# Patient Record
Sex: FEMALE | Race: WHITE | Employment: FULL TIME | URBAN - METROPOLITAN AREA
[De-identification: names, ages, dates, MRNs, and addresses within clinical notes are randomized per-mention and may not be internally consistent; named-entity substitution may affect disease eponyms.]

---

## 2022-07-15 ENCOUNTER — PREP FOR PROCEDURE (OUTPATIENT)
Dept: ADMINISTRATIVE | Age: 62
End: 2022-07-15

## 2022-07-15 RX ORDER — GLUCOSAMINE/D3/BOSWELLIA SERRA 1500MG-400
1 TABLET ORAL DAILY
COMMUNITY

## 2022-07-15 RX ORDER — MULTIVIT-MIN/IRON/FOLIC ACID/K 18-600-40
1 CAPSULE ORAL DAILY
COMMUNITY

## 2022-07-15 RX ORDER — MULTIVIT WITH MINERALS/LUTEIN
1000 TABLET ORAL DAILY
COMMUNITY

## 2022-07-15 NOTE — PERIOP NOTE
Patient verified name and . Order for consent not found in EHR and matches case posting; patient verifies procedure. Type 1B surgery, phone assessment complete. Orders none received. Labs per surgeon: none  Labs per anesthesia protocol: none    Patient answered medical/surgical history questions at their best of ability. All prior to admission medications documented in [facility]. Patient instructed to take the following medications the day of surgery according to anesthesia guidelines with a small sip of water: Sertraline (Zoloft)   On the day before surgery please take Acetaminophen 1000mg in the morning and then again before bed. You may substitute for Tylenol 650 mg. Hold all vitamins 7 days prior to surgery and NSAIDS 5 days prior to surgery. Prescription meds to hold:none  Patient instructed on the following:    > Arrive [address], time of arrival to be called the day before by 1700  > NPO after midnight, unless otherwise indicated, including gum, mints, and ice chips  > Responsible adult must drive patient to the hospital, stay during surgery, and patient will need supervision 24 hours after anesthesia  > Use antibacterial soap in shower the night before surgery and on the morning of surgery  > All piercings must be removed prior to arrival.    > Leave all valuables (money and jewelry) at home but bring insurance card and ID on DOS.   > You may be required to pay a deductible or co-pay on the day of your procedure. You can pre-pay by calling [phone] if your surgery is at the [facility] or [phone] if your surgery is at the [facility]. > Do not wear make-up, nail polish, lotions, cologne, perfumes, powders, or oil on skin. Artificial nails are not permitted.

## 2022-07-18 ENCOUNTER — ANESTHESIA EVENT (OUTPATIENT)
Dept: SURGERY | Age: 62
End: 2022-07-18
Payer: COMMERCIAL

## 2022-07-19 ENCOUNTER — ANESTHESIA (OUTPATIENT)
Dept: SURGERY | Age: 62
End: 2022-07-19
Payer: COMMERCIAL

## 2022-07-19 ENCOUNTER — HOSPITAL ENCOUNTER (OUTPATIENT)
Age: 62
Setting detail: OUTPATIENT SURGERY
Discharge: HOME | End: 2022-07-19
Attending: OPHTHALMOLOGY | Admitting: OPHTHALMOLOGY
Payer: COMMERCIAL

## 2022-07-19 VITALS
BODY MASS INDEX: 23.14 KG/M2 | HEART RATE: 91 BPM | SYSTOLIC BLOOD PRESSURE: 113 MMHG | WEIGHT: 144 LBS | DIASTOLIC BLOOD PRESSURE: 62 MMHG | HEIGHT: 66 IN | OXYGEN SATURATION: 92 % | RESPIRATION RATE: 15 BRPM | TEMPERATURE: 98 F

## 2022-07-19 PROCEDURE — 6370000000 HC RX 637 (ALT 250 FOR IP): Performed by: OPHTHALMOLOGY

## 2022-07-19 PROCEDURE — 2580000003 HC RX 258: Performed by: ANESTHESIOLOGY

## 2022-07-19 PROCEDURE — 3600000012 HC SURGERY LEVEL 2 ADDTL 15MIN: Performed by: OPHTHALMOLOGY

## 2022-07-19 PROCEDURE — 2720000010 HC SURG SUPPLY STERILE: Performed by: OPHTHALMOLOGY

## 2022-07-19 PROCEDURE — 6360000002 HC RX W HCPCS: Performed by: OPHTHALMOLOGY

## 2022-07-19 PROCEDURE — 6370000000 HC RX 637 (ALT 250 FOR IP): Performed by: ANESTHESIOLOGY

## 2022-07-19 PROCEDURE — C1814 RETINAL TAMP, SILICONE OIL: HCPCS | Performed by: OPHTHALMOLOGY

## 2022-07-19 PROCEDURE — 2709999900 HC NON-CHARGEABLE SUPPLY: Performed by: OPHTHALMOLOGY

## 2022-07-19 PROCEDURE — 2500000003 HC RX 250 WO HCPCS: Performed by: OPHTHALMOLOGY

## 2022-07-19 PROCEDURE — 6360000002 HC RX W HCPCS

## 2022-07-19 PROCEDURE — 7100000000 HC PACU RECOVERY - FIRST 15 MIN: Performed by: OPHTHALMOLOGY

## 2022-07-19 PROCEDURE — 3600000002 HC SURGERY LEVEL 2 BASE: Performed by: OPHTHALMOLOGY

## 2022-07-19 PROCEDURE — 2580000003 HC RX 258: Performed by: NURSE ANESTHETIST, CERTIFIED REGISTERED

## 2022-07-19 PROCEDURE — C1784 OCULAR DEV, INTRAOP, DET RET: HCPCS | Performed by: OPHTHALMOLOGY

## 2022-07-19 PROCEDURE — C1713 ANCHOR/SCREW BN/BN,TIS/BN: HCPCS | Performed by: OPHTHALMOLOGY

## 2022-07-19 PROCEDURE — 6360000002 HC RX W HCPCS: Performed by: NURSE ANESTHETIST, CERTIFIED REGISTERED

## 2022-07-19 PROCEDURE — 7100000001 HC PACU RECOVERY - ADDTL 15 MIN: Performed by: OPHTHALMOLOGY

## 2022-07-19 PROCEDURE — 7100000010 HC PHASE II RECOVERY - FIRST 15 MIN: Performed by: OPHTHALMOLOGY

## 2022-07-19 PROCEDURE — 2500000003 HC RX 250 WO HCPCS: Performed by: NURSE ANESTHETIST, CERTIFIED REGISTERED

## 2022-07-19 PROCEDURE — 3700000001 HC ADD 15 MINUTES (ANESTHESIA): Performed by: OPHTHALMOLOGY

## 2022-07-19 PROCEDURE — 3700000000 HC ANESTHESIA ATTENDED CARE: Performed by: OPHTHALMOLOGY

## 2022-07-19 PROCEDURE — 6360000002 HC RX W HCPCS: Performed by: ANESTHESIOLOGY

## 2022-07-19 DEVICE — SILIKON™ 1000 (PURIFIED POLYDIMETHYLSILOXANE) IS HIGHLY PURIFIED LONG CHAIN POLYDIMETHYLSILOXANE TRIMETHYLSILOXY TERMINATED SILICONE OIL. IT IS STERILE,NON-PYROGENIC, CLEAR, COLORLESS AND HAS A VISCOSITY OF 1000 CS. FOR USE AS A POST-OPERATIVE RETINAL TAMPONADE DURING VITREORETINAL SURGERY. SILIKON™ 1000IS COMPOSED OF SILICON, OXYGEN, CARBON AND HYDROGEN ATOMS. SILIKON™ 1000 IS IMMISCIBLE WITH AQUEOUS COMPONENTS AND IS RELATIVELY INERT MATERIAL WITHLITTLE BIOLOGICAL TOXICITY POTENTIAL.
Type: IMPLANTABLE DEVICE | Site: EYE | Status: FUNCTIONAL
Brand: SILIKON™

## 2022-07-19 DEVICE — SLEEVE SCLER BCKL L30IN OD21IN ID1IN SIL STYL 70: Type: IMPLANTABLE DEVICE | Site: EYE | Status: FUNCTIONAL

## 2022-07-19 DEVICE — STRIP SCLER W3.5XL125MM THK0.75MM SIL SLD STYL 41/S2970: Type: IMPLANTABLE DEVICE | Site: EYE | Status: FUNCTIONAL

## 2022-07-19 RX ORDER — TROPICAMIDE 10 MG/ML
1 SOLUTION/ DROPS OPHTHALMIC
Status: COMPLETED | OUTPATIENT
Start: 2022-07-19 | End: 2022-07-19

## 2022-07-19 RX ORDER — PHENYLEPHRINE HYDROCHLORIDE 25 MG/ML
1 SOLUTION/ DROPS OPHTHALMIC
Status: CANCELLED | OUTPATIENT
Start: 2022-07-19 | End: 2022-07-19

## 2022-07-19 RX ORDER — PROCHLORPERAZINE EDISYLATE 5 MG/ML
5 INJECTION INTRAMUSCULAR; INTRAVENOUS
Status: DISCONTINUED | OUTPATIENT
Start: 2022-07-19 | End: 2022-07-19 | Stop reason: HOSPADM

## 2022-07-19 RX ORDER — TRIAMCINOLONE ACETONIDE 40 MG/ML
INJECTION, SUSPENSION INTRA-ARTICULAR; INTRAMUSCULAR PRN
Status: DISCONTINUED | OUTPATIENT
Start: 2022-07-19 | End: 2022-07-19 | Stop reason: ALTCHOICE

## 2022-07-19 RX ORDER — ACETAMINOPHEN 500 MG
1000 TABLET ORAL ONCE
Status: COMPLETED | OUTPATIENT
Start: 2022-07-19 | End: 2022-07-19

## 2022-07-19 RX ORDER — CEFAZOLIN SODIUM 1 G/3ML
INJECTION, POWDER, FOR SOLUTION INTRAMUSCULAR; INTRAVENOUS PRN
Status: DISCONTINUED | OUTPATIENT
Start: 2022-07-19 | End: 2022-07-19 | Stop reason: ALTCHOICE

## 2022-07-19 RX ORDER — BETAMETHASONE SODIUM PHOSPHATE AND BETAMETHASONE ACETATE 3; 3 MG/ML; MG/ML
INJECTION, SUSPENSION INTRA-ARTICULAR; INTRALESIONAL; INTRAMUSCULAR; SOFT TISSUE PRN
Status: DISCONTINUED | OUTPATIENT
Start: 2022-07-19 | End: 2022-07-19 | Stop reason: ALTCHOICE

## 2022-07-19 RX ORDER — PHENYLEPHRINE HCL 2.5 %
1 DROPS OPHTHALMIC (EYE)
Status: COMPLETED | OUTPATIENT
Start: 2022-07-19 | End: 2022-07-19

## 2022-07-19 RX ORDER — KETOROLAC TROMETHAMINE 30 MG/ML
INJECTION, SOLUTION INTRAMUSCULAR; INTRAVENOUS PRN
Status: DISCONTINUED | OUTPATIENT
Start: 2022-07-19 | End: 2022-07-19 | Stop reason: SDUPTHER

## 2022-07-19 RX ORDER — DEXAMETHASONE SODIUM PHOSPHATE 4 MG/ML
INJECTION, SOLUTION INTRA-ARTICULAR; INTRALESIONAL; INTRAMUSCULAR; INTRAVENOUS; SOFT TISSUE PRN
Status: DISCONTINUED | OUTPATIENT
Start: 2022-07-19 | End: 2022-07-19 | Stop reason: SDUPTHER

## 2022-07-19 RX ORDER — TROPICAMIDE 10 MG/ML
1 SOLUTION/ DROPS OPHTHALMIC
Status: CANCELLED | OUTPATIENT
Start: 2022-07-19 | End: 2022-07-19

## 2022-07-19 RX ORDER — OXYCODONE HYDROCHLORIDE 5 MG/1
10 TABLET ORAL PRN
Status: DISCONTINUED | OUTPATIENT
Start: 2022-07-19 | End: 2022-07-19 | Stop reason: HOSPADM

## 2022-07-19 RX ORDER — FENTANYL CITRATE 50 UG/ML
INJECTION, SOLUTION INTRAMUSCULAR; INTRAVENOUS PRN
Status: DISCONTINUED | OUTPATIENT
Start: 2022-07-19 | End: 2022-07-19 | Stop reason: SDUPTHER

## 2022-07-19 RX ORDER — BRIMONIDINE TARTRATE 2 MG/ML
SOLUTION/ DROPS OPHTHALMIC PRN
Status: DISCONTINUED | OUTPATIENT
Start: 2022-07-19 | End: 2022-07-19 | Stop reason: ALTCHOICE

## 2022-07-19 RX ORDER — ATROPINE SULFATE 10 MG/ML
1 SOLUTION/ DROPS OPHTHALMIC
Status: COMPLETED | OUTPATIENT
Start: 2022-07-19 | End: 2022-07-19

## 2022-07-19 RX ORDER — NEOMYCIN SULFATE, POLYMYXIN B SULFATE, AND DEXAMETHASONE 3.5; 10000; 1 MG/G; [USP'U]/G; MG/G
OINTMENT OPHTHALMIC PRN
Status: DISCONTINUED | OUTPATIENT
Start: 2022-07-19 | End: 2022-07-19 | Stop reason: ALTCHOICE

## 2022-07-19 RX ORDER — LIDOCAINE HYDROCHLORIDE 20 MG/ML
INJECTION, SOLUTION EPIDURAL; INFILTRATION; INTRACAUDAL; PERINEURAL PRN
Status: DISCONTINUED | OUTPATIENT
Start: 2022-07-19 | End: 2022-07-19 | Stop reason: ALTCHOICE

## 2022-07-19 RX ORDER — DIPHENHYDRAMINE HYDROCHLORIDE 50 MG/ML
12.5 INJECTION INTRAMUSCULAR; INTRAVENOUS
Status: DISCONTINUED | OUTPATIENT
Start: 2022-07-19 | End: 2022-07-19 | Stop reason: HOSPADM

## 2022-07-19 RX ORDER — SODIUM CHLORIDE 0.9 % (FLUSH) 0.9 %
5-40 SYRINGE (ML) INJECTION EVERY 12 HOURS SCHEDULED
Status: DISCONTINUED | OUTPATIENT
Start: 2022-07-19 | End: 2022-07-19 | Stop reason: HOSPADM

## 2022-07-19 RX ORDER — OXYCODONE HYDROCHLORIDE 5 MG/1
5 TABLET ORAL PRN
Status: DISCONTINUED | OUTPATIENT
Start: 2022-07-19 | End: 2022-07-19 | Stop reason: HOSPADM

## 2022-07-19 RX ORDER — ONDANSETRON 2 MG/ML
4 INJECTION INTRAMUSCULAR; INTRAVENOUS
Status: DISCONTINUED | OUTPATIENT
Start: 2022-07-19 | End: 2022-07-19 | Stop reason: HOSPADM

## 2022-07-19 RX ORDER — LIDOCAINE HYDROCHLORIDE 10 MG/ML
1 INJECTION, SOLUTION EPIDURAL; INFILTRATION; INTRACAUDAL; PERINEURAL
Status: DISCONTINUED | OUTPATIENT
Start: 2022-07-19 | End: 2022-07-19 | Stop reason: HOSPADM

## 2022-07-19 RX ORDER — HYDROMORPHONE HYDROCHLORIDE 2 MG/ML
0.25 INJECTION, SOLUTION INTRAMUSCULAR; INTRAVENOUS; SUBCUTANEOUS EVERY 5 MIN PRN
Status: DISCONTINUED | OUTPATIENT
Start: 2022-07-19 | End: 2022-07-19 | Stop reason: HOSPADM

## 2022-07-19 RX ORDER — BUPIVACAINE HYDROCHLORIDE 7.5 MG/ML
INJECTION, SOLUTION EPIDURAL; RETROBULBAR PRN
Status: DISCONTINUED | OUTPATIENT
Start: 2022-07-19 | End: 2022-07-19 | Stop reason: ALTCHOICE

## 2022-07-19 RX ORDER — HYDROMORPHONE HYDROCHLORIDE 2 MG/ML
0.5 INJECTION, SOLUTION INTRAMUSCULAR; INTRAVENOUS; SUBCUTANEOUS EVERY 5 MIN PRN
Status: DISCONTINUED | OUTPATIENT
Start: 2022-07-19 | End: 2022-07-19 | Stop reason: HOSPADM

## 2022-07-19 RX ORDER — ONDANSETRON 2 MG/ML
INJECTION INTRAMUSCULAR; INTRAVENOUS PRN
Status: DISCONTINUED | OUTPATIENT
Start: 2022-07-19 | End: 2022-07-19 | Stop reason: SDUPTHER

## 2022-07-19 RX ORDER — MIDAZOLAM HYDROCHLORIDE 2 MG/2ML
2 INJECTION, SOLUTION INTRAMUSCULAR; INTRAVENOUS
Status: COMPLETED | OUTPATIENT
Start: 2022-07-19 | End: 2022-07-19

## 2022-07-19 RX ORDER — ATROPINE SULFATE 10 MG/ML
1 SOLUTION/ DROPS OPHTHALMIC
Status: CANCELLED | OUTPATIENT
Start: 2022-07-19 | End: 2022-07-19

## 2022-07-19 RX ORDER — PROPOFOL 10 MG/ML
INJECTION, EMULSION INTRAVENOUS PRN
Status: DISCONTINUED | OUTPATIENT
Start: 2022-07-19 | End: 2022-07-19 | Stop reason: SDUPTHER

## 2022-07-19 RX ORDER — SODIUM CHLORIDE 9 MG/ML
INJECTION, SOLUTION INTRAVENOUS PRN
Status: DISCONTINUED | OUTPATIENT
Start: 2022-07-19 | End: 2022-07-19 | Stop reason: HOSPADM

## 2022-07-19 RX ORDER — LIDOCAINE HYDROCHLORIDE 20 MG/ML
INJECTION, SOLUTION EPIDURAL; INFILTRATION; INTRACAUDAL; PERINEURAL PRN
Status: DISCONTINUED | OUTPATIENT
Start: 2022-07-19 | End: 2022-07-19 | Stop reason: SDUPTHER

## 2022-07-19 RX ORDER — SODIUM CHLORIDE, SODIUM LACTATE, POTASSIUM CHLORIDE, CALCIUM CHLORIDE 600; 310; 30; 20 MG/100ML; MG/100ML; MG/100ML; MG/100ML
INJECTION, SOLUTION INTRAVENOUS CONTINUOUS
Status: DISCONTINUED | OUTPATIENT
Start: 2022-07-19 | End: 2022-07-19 | Stop reason: HOSPADM

## 2022-07-19 RX ORDER — SODIUM CHLORIDE 0.9 % (FLUSH) 0.9 %
5-40 SYRINGE (ML) INJECTION PRN
Status: DISCONTINUED | OUTPATIENT
Start: 2022-07-19 | End: 2022-07-19 | Stop reason: HOSPADM

## 2022-07-19 RX ORDER — EPHEDRINE SULFATE/0.9% NACL/PF 50 MG/5 ML
SYRINGE (ML) INTRAVENOUS PRN
Status: DISCONTINUED | OUTPATIENT
Start: 2022-07-19 | End: 2022-07-19 | Stop reason: SDUPTHER

## 2022-07-19 RX ADMIN — LIDOCAINE HYDROCHLORIDE 100 MG: 20 INJECTION, SOLUTION EPIDURAL; INFILTRATION; INTRACAUDAL; PERINEURAL at 13:24

## 2022-07-19 RX ADMIN — KETOROLAC TROMETHAMINE 15 MG: 30 INJECTION, SOLUTION INTRAMUSCULAR; INTRAVENOUS at 15:21

## 2022-07-19 RX ADMIN — MIDAZOLAM 1 MG: 1 INJECTION INTRAMUSCULAR; INTRAVENOUS at 13:20

## 2022-07-19 RX ADMIN — PHENYLEPHRINE HYDROCHLORIDE 0.5 ML: 10 INJECTION INTRAVENOUS at 14:03

## 2022-07-19 RX ADMIN — SODIUM CHLORIDE, POTASSIUM CHLORIDE, SODIUM LACTATE AND CALCIUM CHLORIDE: 600; 310; 30; 20 INJECTION, SOLUTION INTRAVENOUS at 12:57

## 2022-07-19 RX ADMIN — PHENYLEPHRINE HYDROCHLORIDE 0.5 ML: 10 INJECTION INTRAVENOUS at 14:19

## 2022-07-19 RX ADMIN — Medication 5 MG: at 13:58

## 2022-07-19 RX ADMIN — ONDANSETRON 4 MG: 2 INJECTION INTRAMUSCULAR; INTRAVENOUS at 14:24

## 2022-07-19 RX ADMIN — PHENYLEPHRINE HYDROCHLORIDE 1 DROP: 25 SOLUTION/ DROPS OPHTHALMIC at 12:36

## 2022-07-19 RX ADMIN — TROPICAMIDE 1 DROP: 10 SOLUTION/ DROPS OPHTHALMIC at 12:37

## 2022-07-19 RX ADMIN — PHENYLEPHRINE HYDROCHLORIDE 0.5 ML: 10 INJECTION INTRAVENOUS at 14:07

## 2022-07-19 RX ADMIN — Medication 5 MG: at 13:52

## 2022-07-19 RX ADMIN — DEXAMETHASONE SODIUM PHOSPHATE 4 MG: 4 INJECTION, SOLUTION INTRAMUSCULAR; INTRAVENOUS at 13:24

## 2022-07-19 RX ADMIN — ATROPINE SULFATE 1 DROP: 10 SOLUTION/ DROPS OPHTHALMIC at 12:44

## 2022-07-19 RX ADMIN — PHENYLEPHRINE HYDROCHLORIDE 1 ML: 10 INJECTION INTRAVENOUS at 14:28

## 2022-07-19 RX ADMIN — PHENYLEPHRINE HYDROCHLORIDE 0.5 ML: 10 INJECTION INTRAVENOUS at 14:38

## 2022-07-19 RX ADMIN — ACETAMINOPHEN 1000 MG: 500 TABLET, FILM COATED ORAL at 12:42

## 2022-07-19 RX ADMIN — TROPICAMIDE 1 DROP: 10 SOLUTION/ DROPS OPHTHALMIC at 12:44

## 2022-07-19 RX ADMIN — Medication 5 MG: at 13:34

## 2022-07-19 RX ADMIN — Medication 5 MG: at 13:49

## 2022-07-19 RX ADMIN — FENTANYL CITRATE 25 MCG: 50 INJECTION, SOLUTION INTRAMUSCULAR; INTRAVENOUS at 13:24

## 2022-07-19 RX ADMIN — Medication 5 MG: at 13:46

## 2022-07-19 RX ADMIN — ATROPINE SULFATE 1 DROP: 10 SOLUTION/ DROPS OPHTHALMIC at 12:36

## 2022-07-19 RX ADMIN — PROPOFOL 200 MG: 10 INJECTION, EMULSION INTRAVENOUS at 13:24

## 2022-07-19 RX ADMIN — PHENYLEPHRINE HYDROCHLORIDE 1 DROP: 25 SOLUTION/ DROPS OPHTHALMIC at 12:58

## 2022-07-19 RX ADMIN — TROPICAMIDE 1 DROP: 10 SOLUTION/ DROPS OPHTHALMIC at 12:58

## 2022-07-19 RX ADMIN — PHENYLEPHRINE HYDROCHLORIDE 0.5 ML: 10 INJECTION INTRAVENOUS at 14:10

## 2022-07-19 RX ADMIN — PHENYLEPHRINE HYDROCHLORIDE 0.5 ML: 10 INJECTION INTRAVENOUS at 14:46

## 2022-07-19 RX ADMIN — PHENYLEPHRINE HYDROCHLORIDE 0.5 ML: 10 INJECTION INTRAVENOUS at 14:16

## 2022-07-19 RX ADMIN — PHENYLEPHRINE HYDROCHLORIDE 1 DROP: 25 SOLUTION/ DROPS OPHTHALMIC at 12:44

## 2022-07-19 ASSESSMENT — PAIN SCALES - GENERAL: PAINLEVEL_OUTOF10: 0

## 2022-07-19 ASSESSMENT — PAIN - FUNCTIONAL ASSESSMENT: PAIN_FUNCTIONAL_ASSESSMENT: 0-10

## 2022-07-19 NOTE — DISCHARGE INSTRUCTIONS
See attached discharge instructions. MEDICATION INTERACTION:  During your procedure you potentially received a medication or medications which may reduce the effectiveness of oral contraceptives. Please consider other forms of contraception for 1 month following your procedure if you are currently using oral contraceptives as your primary form of birth control. In addition to this, we recommend continuing your oral contraceptive as prescribed, unless otherwise instructed by your physician, during this time    After general anesthesia or intravenous sedation, for 24 hours or while taking prescription Narcotics:  Limit your activities  A responsible adult needs to be with you for the next 24 hours  Do not drive and operate hazardous machinery  Do not make important personal or business decisions  Do not drink alcoholic beverages  If you have not urinated within 8 hours after discharge, and you are experiencing discomfort from urinary retention, please go to the nearest ED. If you have sleep apnea and have a CPAP machine, please use it for all naps and sleeping. Please use caution when taking narcotics and any of your home medications that may cause drowsiness. *  Please give a list of your current medications to your Primary Care Provider. *  Please update this list whenever your medications are discontinued, doses are      changed, or new medications (including over-the-counter products) are added. *  Please carry medication information at all times in case of emergency situations. These are general instructions for a healthy lifestyle:  No smoking/ No tobacco products/ Avoid exposure to second hand smoke  Surgeon General's Warning:  Quitting smoking now greatly reduces serious risk to your health.   Obesity, smoking, and sedentary lifestyle greatly increases your risk for illness  A healthy diet, regular physical exercise & weight monitoring are important for maintaining a healthy lifestyle    You may be retaining fluid if you have a history of heart failure or if you experience any of the following symptoms:  Weight gain of 3 pounds or more overnight or 5 pounds in a week, increased swelling in our hands or feet or shortness of breath while lying flat in bed. Please call your doctor as soon as you notice any of these symptoms; do not wait until your next office visit.

## 2022-07-19 NOTE — ANESTHESIA PRE PROCEDURE
Department of Anesthesiology  Preprocedure Note       Name:  Bro Arango   Age:  63 y.o.  :  1960                                          MRN:  [id number]         Date:  2022      Surgeon: Paige Ardon):  Luz Stewart MD    Procedure: Procedure(s):  LEFT VITRECTOMY 23G/ LASER/ GAS FLUID EXCHANGE/ SCLERAL BUCKLE PLACEMENT/ LEFT    Medications prior to admission:   Prior to Admission medications    Medication Sig Start Date End Date Taking? Authorizing Provider   sertraline (ZOLOFT) 50 MG tablet Take 50 mg by mouth in the morning. Yes Historical Provider, MD   Cholecalciferol (VITAMIN D) 50 MCG (2000 UT) CAPS capsule Take 1 capsule by mouth in the morning. Yes Historical Provider, MD   Biotin 93282 MCG TABS Take 1 tablet by mouth daily   Yes Historical Provider, MD   Flaxed, Linseed, (FLAXSEED OIL PO) Take 1 capsule by mouth daily   Yes Historical Provider, MD   Ascorbic Acid (VITAMIN C) 1000 MG tablet Take 1,000 mg by mouth in the morning.    Yes Historical Provider, MD       Current medications:    Current Facility-Administered Medications   Medication Dose Route Frequency Provider Last Rate Last Admin    lidocaine PF 1 % injection 1 mL  1 mL IntraDERmal Once PRN Unknown Nancy GUZMAN MD        lactated ringers infusion   IntraVENous Continuous Unknown Nancy GUZMAN  mL/hr at 22 1319 NoRateChange at 22 1319    sodium chloride flush 0.9 % injection 5-40 mL  5-40 mL IntraVENous 2 times per day Unknown Nancy GUZMAN MD        sodium chloride flush 0.9 % injection 5-40 mL  5-40 mL IntraVENous PRN Kendra Cruz IV, MD        0.9 % sodium chloride infusion   IntraVENous PRN Unknown Nancy GUZMAN MD        bupivacaine (PF) (MARCAINE) 0.75 % injection    PRN Luz Stewart MD   3 mL at 22 1336    lidocaine PF 2 % injection    PRN Luz Stewart MD   3 mL at 22 1336    betamethasone acetate-betamethasone sodium phosphate (CELESTONE) injection    PRN Vanesa Hodges Serafin De MD   3 mg at 07/19/22 1341    brimonidine (ALPHAGAN) 0.2 % ophthalmic solution    PRN Krista Grayson MD   1 drop at 07/19/22 1341    ceFAZolin (ANCEF) injection    PRN Krista Grayson MD   50 mg at 07/19/22 1341    Na Chondroit Sulf-Na Hyaluron (VISCOAT) intra-ocular injection    PRN Krista Grayson MD   0.5 mL at 07/19/22 1342    neomycin-polymyxin-dexameth ophthalmic ointment    PRN Krista Grayson MD   750 mg at 07/19/22 1342     Facility-Administered Medications Ordered in Other Encounters   Medication Dose Route Frequency Provider Last Rate Last Admin    lidocaine PF 2 % injection   IntraVENous PRN Rolley Ly, APRN - CRNA   100 mg at 07/19/22 1324    propofol injection   IntraVENous PRN Rolley Ly, APRN - CRNA   200 mg at 07/19/22 1324    fentaNYL (SUBLIMAZE) injection   IntraVENous PRN Rolley Ly, APRN - CRNA   25 mcg at 07/19/22 1324    dexamethasone (DECADRON) injection   IntraVENous PRN Rolley Ly, APRN - CRNA   4 mg at 07/19/22 1324    ePHEDrine injection   IntraVENous PRN Rolley Ly, APRN - CRNA   5 mg at 07/19/22 1334       Allergies:  No Known Allergies    Problem List:  There is no problem list on file for this patient.       Past Medical History:        Diagnosis Date    Anxiety        Past Surgical History:        Procedure Laterality Date    APPENDECTOMY         Social History:    Social History     Tobacco Use    Smoking status: Never    Smokeless tobacco: Never   Substance Use Topics    Alcohol use: Never                                Counseling given: Not Answered      Vital Signs (Current):   Vitals:    07/15/22 1426 07/19/22 1231   BP:  (!) 146/71   Pulse:  71   Resp:  18   Temp:  98.3 °F (36.8 °C)   TempSrc:  Oral   SpO2:  98%   Weight: 144 lb (65.3 kg) 144 lb (65.3 kg)   Height: 5' 6\" (1.676 m)                                               BP Readings from Last 3 Encounters:   07/19/22 (!) 146/71       NPO Status: Time of last liquid consumption: 2100                        Time of last solid consumption: 2100                        Date of last liquid consumption: 07/18/22                        Date of last solid food consumption: 07/18/22    BMI:   Wt Readings from Last 3 Encounters:   07/19/22 144 lb (65.3 kg)     Body mass index is 23.24 kg/m². CBC: No results found for: WBC, RBC, HGB, HCT, MCV, RDW, PLT    CMP: No results found for: NA, K, CL, CO2, BUN, CREATININE, GFRAA, AGRATIO, LABGLOM, GLUCOSE, GLU, PROT, CALCIUM, BILITOT, ALKPHOS, AST, ALT    POC Tests: No results for input(s): POCGLU, POCNA, POCK, POCCL, POCBUN, POCHEMO, POCHCT in the last 72 hours. Coags: No results found for: PROTIME, INR, APTT    HCG (If Applicable): No results found for: PREGTESTUR, PREGSERUM, HCG, HCGQUANT     ABGs: No results found for: PHART, PO2ART, WMO1FBF, BFX3LTG, BEART, T9MMUDWS     Type & Screen (If Applicable):  No results found for: LABABO, LABRH    Drug/Infectious Status (If Applicable):  No results found for: HIV, HEPCAB    COVID-19 Screening (If Applicable): No results found for: COVID19        Anesthesia Evaluation  Patient summary reviewed and Nursing notes reviewed  Airway: Mallampati: I  TM distance: >3 FB   Neck ROM: full  Mouth opening: > = 3 FB   Dental: normal exam         Pulmonary:Negative Pulmonary ROS breath sounds clear to auscultation                             Cardiovascular:  Exercise tolerance: good (>4 METS),           Rhythm: regular  Rate: normal                    Neuro/Psych:   Negative Neuro/Psych ROS              GI/Hepatic/Renal: Neg GI/Hepatic/Renal ROS            Endo/Other: Negative Endo/Other ROS                    Abdominal:             Vascular: negative vascular ROS. Other Findings:           Anesthesia Plan      general     ASA 1       Induction: intravenous. Anesthetic plan and risks discussed with patient.                         Sherly Rubio MD 7/19/2022

## 2022-07-19 NOTE — ANESTHESIA POSTPROCEDURE EVALUATION
Department of Anesthesiology  Postprocedure Note    Patient: Chito Dinh  MRN: [id number]  YOB: 1960  Date of evaluation: 7/19/2022      Procedure Summary     Date: 07/19/22 Room / Location: [facility] OP OR 08 / [facility]    Anesthesia Start: 1319 Anesthesia Stop: 1246    Procedure: LEFT VITRECTOMY 23G/ LASER/ GAS FLUID EXCHANGE/ SCLERAL BUCKLE PLACEMENT/ LEFT (Left: Eye) Diagnosis:       Retinal detachment, recent, partial, with single defect, left      (Retinal detachment, recent, partial, with single defect, left [H33.012])    Surgeons: Primo Grande MD Responsible Provider: Mindi Diamond MD    Anesthesia Type: general ASA Status: 1          Anesthesia Type: No value filed.     Abdifatah Phase I: Abdfiatah Score: 10    Abdifatah Phase II: Abdifatah Score: 10      Anesthesia Post Evaluation    Patient location during evaluation: PACU  Patient participation: complete - patient participated  Level of consciousness: awake  Airway patency: patent  Nausea & Vomiting: no nausea  Complications: no  Cardiovascular status: blood pressure returned to baseline  Respiratory status: acceptable  Hydration status: euvolemic  Multimodal analgesia pain management approach

## 2022-07-20 NOTE — OP NOTE
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 PROCEDURE PERFORMED:   23-gauge pars plana vitrectomy, membrane peel, TissueBlue assisted ILM peel, scleral buckle (#81/52), endolaser, silicone oil, laser, left eye. SURGEON:  Denilson Avila MD    ASSISTANT:  None. ANESTHESIA:  General.    COMPLICATIONS:  None. SPECIMENS REMOVED:  None. IMPLANTS:  Scleral buckling components per above and silicone oil, left eye. ESTIMATED BLOOD LOSS:  None. FLUIDS:  Per Anesthesia. INSTRUMENTS:  All instruments were accounted for at the end of the case. FINDINGS:  Macula off temporal inferior rhegmatogenous retinal detachment with two horseshoe tears superotemporally, one small break at 6 o'clock. There were several star folds at 3, 5 and 7 o'clock, as well as mild epiretinal membrane. NARRATIVE:  The patient is a very pleasant woman that presented with a 2-month history of decreased vision due to rhegmatogenous retinal detachment. She was evaluated in the office and after a long discussion decided to pursue surgery to try and preserve some vision in this eye. TECHNIQUE:  The patient was evaluated in the preop holding area. The operative eye was marked and informed consent documented. The risks discussed included, but not limited to loss of vision, loss of eye, bleeding, infection, cataract, glaucoma, recurrent tear or detachment, cosmetic deformity including drooping lid or ocular misalignment and possible need for additional procedures.   Once all questions were answered to the patient's satisfaction, care of the patient was transferred to the Anesthesia service who brought the patient back to the operating room and induced general anesthesia. The right eye was taped and shielded. The left eye was prepped and draped in usual sterile fashion for ophthalmic surgery. A Brent speculum was placed in the left eye. A 360 degrees conjunctival peritomy was fashioned in the four quadrants and were bluntly dissected using Cortes tenotomy scissors. Next, a sub-Tenon's block consisting of 50:50 mixture of 2% lidocaine with 0.75% Marcaine was given. Next, the four rectus muscles were sequentially isolated using a muscle hook and tagged with 2-0 silk ties. Next, the four quadrants were inspected and found to be free of ectasia. Next, a #41 style scleral buckle was fixated to the equator of the globe using 5-0 nylon sutures in a horizontal mattress fashion, one in each quadrant. Next, the ends of the buckle were secured using a #70 sleeve placed superonasally superior to the superonasal suture. Next, three-port pars plana vitrectomy was initiated with insertion of infusion cannula 4 mm back from the corneoscleral limbus in a beveled manner inferotemporally. Once the tip of the infusion cannula was confirmed to be within the vitreous cavity, the infusion was turned on and the line secured. Similarly, superotemporal and superonasal cannulas were placed through which a light pipe and vitrector were introduced and a core vitrectomy was performed. Of note, a spontaneous PVD was present. The vitreous was trimmed back to the vitreous base under scleral depression as closely as possible for 360 degrees. There were two horseshoe tears noted at 1 and 2 o'clock. The flaps of the tears were amputated and the breaks marked with endodiathermy. There was a very small break unfortunately down at 6 o'clock. The flap of this tear was amputated and the break marked with endodiathermy. No additional tears were noted and all breaks were nicely supported on the buckle. Next, under contact lens magnification, the surface of the macula was stained with a solution of TissueBlue. The ILM and overlying mild epiretinal membrane were peeled for 360 degrees of the surface of the macula. Similarly, the star folds were gently stained using TissueBlue engaged using a combination of 23-gauge ILM forceps and 25-gauge serrated forceps and were peeled off the macula without issue resulting in a much more relaxed configuration. Next, the drainage retinotomy site was fashioned superotemporally using endodiathermy and soft-tip cannula. A complete fluid-air exchange was performed with drainage of all subretinal fluid through the cannula. No persistent retinal folds remained. All breaks were well supported on the buckle. Next, endolaser was applied for 360 degrees with careful attention to surround all aforementioned breaks, as well as the drainage retinotomy. The laser was carried to the ora isaura under scleral depression. Next, the vitreous cavity was filled with 6575 centistokes silicone oil. Next, the cannulas were removed atraumatically. All sclerotomies were oversewn using 8-0 Vicryl. The wounds were assessed and found to  be watertight. The eye was left at physiologic pressure. Next, the ends of the buckle were trimmed and the silk ties removed. The surface of the globe was copiously irrigated with BSS to remove any residual silicone oil. Next, the conjunctival peritomy was reapproximated using 6-0 plain gut. A subconjunctival injection of Ancef and betamethasone was given. The Brent speculum was removed. The eye washed and dried and a drop of atropine, drop of brimonidine and small amount of Maxitrol ointment were placed. The eye was then patched and shielded. The patient tolerated the procedure very well, transferred to recovery in stable condition. She was advised to maintain a face-down position while awake and to sleep on her right side this evening.   She will follow up with us tomorrow morning in the office.         Leeanne Weber MD [id number]  D:  07/19/2022 15:52  T:  07/20/2022 1:40  JOB #:  [id number]

## 2023-02-14 NOTE — PROGRESS NOTES
Patient verified name and     Order for consent WAS found in EHR and matches case posting; patient verified. Type 1B surgery, PHONE assessment complete. Labs per surgeon: NONE  Labs per anesthesia protocol: NONE  EKG: NONE  . Hospital approved surgical skin cleanser and instructions given per hospital policy. Patient provided with and instructed on educational handouts including Guide to Surgery, Pain Management, Hand Hygiene, Blood Transfusion Education, and [facility] Brochure. Patient answered medical/surgical history questions at their best of ability. All prior to admission medications documented in [facility]. Original medication prescription bottle WAS NOT visualized during patient appointment. Patient instructed to hold all vitamins 7 days prior to surgery and NSAIDS 5 days prior to surgery, patient verbalized understanding. Patient teach back successful and patient demonstrates knowledge of instructions.

## 2023-02-14 NOTE — PROGRESS NOTES
PLEASE CONTINUE TAKING ALL PRESCRIPTION MEDICATIONS UP TO THE DAY OF SURGERY UNLESS OTHERWISE DIRECTED BELOW. DISCONTINUE all vitamins and supplements 7 days prior to surgery. DISCONTINUE Non-Steriodal Anti-Inflammatory (NSAIDS) such as Advil and Aleve 5 days prior to surgery. Home Medications to take  the day of surgery    zoloft           Home Medications   to Hold   All vitamins and supplements        Comments                Please do not bring home medications with you on the day of surgery unless otherwise directed by your nurse. If you are instructed to bring home medications, please give them to your nurse as they will be administered by the nursing staff. If you have any questions, please call [address] [place] [address] [phone] or [address] [place] [address] [phone]. A copy of this note was provided to the patient for reference.

## 2023-02-26 ENCOUNTER — ANESTHESIA EVENT (OUTPATIENT)
Dept: SURGERY | Age: 63
End: 2023-02-26
Payer: COMMERCIAL

## 2023-02-27 ENCOUNTER — HOSPITAL ENCOUNTER (OUTPATIENT)
Age: 63
Setting detail: OUTPATIENT SURGERY
Discharge: HOME | End: 2023-02-27
Attending: OPHTHALMOLOGY | Admitting: OPHTHALMOLOGY
Payer: COMMERCIAL

## 2023-02-27 ENCOUNTER — ANESTHESIA (OUTPATIENT)
Dept: SURGERY | Age: 63
End: 2023-02-27
Payer: COMMERCIAL

## 2023-02-27 VITALS
SYSTOLIC BLOOD PRESSURE: 122 MMHG | RESPIRATION RATE: 15 BRPM | WEIGHT: 145 LBS | HEART RATE: 72 BPM | DIASTOLIC BLOOD PRESSURE: 66 MMHG | TEMPERATURE: 97.5 F | HEIGHT: 66 IN | BODY MASS INDEX: 23.3 KG/M2 | OXYGEN SATURATION: 97 %

## 2023-02-27 PROCEDURE — 6360000002 HC RX W HCPCS: Performed by: NURSE ANESTHETIST, CERTIFIED REGISTERED

## 2023-02-27 PROCEDURE — 3700000000 HC ANESTHESIA ATTENDED CARE: Performed by: OPHTHALMOLOGY

## 2023-02-27 PROCEDURE — 7100000000 HC PACU RECOVERY - FIRST 15 MIN: Performed by: OPHTHALMOLOGY

## 2023-02-27 PROCEDURE — C1713 ANCHOR/SCREW BN/BN,TIS/BN: HCPCS | Performed by: OPHTHALMOLOGY

## 2023-02-27 PROCEDURE — 2500000003 HC RX 250 WO HCPCS: Performed by: OPHTHALMOLOGY

## 2023-02-27 PROCEDURE — 3600000004 HC SURGERY LEVEL 4 BASE: Performed by: OPHTHALMOLOGY

## 2023-02-27 PROCEDURE — 7100000001 HC PACU RECOVERY - ADDTL 15 MIN: Performed by: OPHTHALMOLOGY

## 2023-02-27 PROCEDURE — 2500000003 HC RX 250 WO HCPCS: Performed by: NURSE ANESTHETIST, CERTIFIED REGISTERED

## 2023-02-27 PROCEDURE — 3700000001 HC ADD 15 MINUTES (ANESTHESIA): Performed by: OPHTHALMOLOGY

## 2023-02-27 PROCEDURE — 6370000000 HC RX 637 (ALT 250 FOR IP): Performed by: OPHTHALMOLOGY

## 2023-02-27 PROCEDURE — 6370000000 HC RX 637 (ALT 250 FOR IP): Performed by: ANESTHESIOLOGY

## 2023-02-27 PROCEDURE — 7100000010 HC PHASE II RECOVERY - FIRST 15 MIN: Performed by: OPHTHALMOLOGY

## 2023-02-27 PROCEDURE — 6360000002 HC RX W HCPCS: Performed by: OPHTHALMOLOGY

## 2023-02-27 PROCEDURE — 7100000011 HC PHASE II RECOVERY - ADDTL 15 MIN: Performed by: OPHTHALMOLOGY

## 2023-02-27 PROCEDURE — 2580000003 HC RX 258: Performed by: OPHTHALMOLOGY

## 2023-02-27 PROCEDURE — 2709999900 HC NON-CHARGEABLE SUPPLY: Performed by: OPHTHALMOLOGY

## 2023-02-27 PROCEDURE — 2580000003 HC RX 258: Performed by: ANESTHESIOLOGY

## 2023-02-27 PROCEDURE — 3600000014 HC SURGERY LEVEL 4 ADDTL 15MIN: Performed by: OPHTHALMOLOGY

## 2023-02-27 RX ORDER — DEXAMETHASONE SODIUM PHOSPHATE 4 MG/ML
INJECTION, SOLUTION INTRA-ARTICULAR; INTRALESIONAL; INTRAMUSCULAR; INTRAVENOUS; SOFT TISSUE PRN
Status: DISCONTINUED | OUTPATIENT
Start: 2023-02-27 | End: 2023-02-27 | Stop reason: SDUPTHER

## 2023-02-27 RX ORDER — OXYCODONE HYDROCHLORIDE 5 MG/1
5 TABLET ORAL
Status: DISCONTINUED | OUTPATIENT
Start: 2023-02-27 | End: 2023-02-27 | Stop reason: HOSPADM

## 2023-02-27 RX ORDER — 0.9 % SODIUM CHLORIDE 0.9 %
INTRAVENOUS SOLUTION INTRAVENOUS CONTINUOUS PRN
Status: DISCONTINUED | OUTPATIENT
Start: 2023-02-27 | End: 2023-02-27 | Stop reason: HOSPADM

## 2023-02-27 RX ORDER — ONDANSETRON 2 MG/ML
INJECTION INTRAMUSCULAR; INTRAVENOUS PRN
Status: DISCONTINUED | OUTPATIENT
Start: 2023-02-27 | End: 2023-02-27 | Stop reason: SDUPTHER

## 2023-02-27 RX ORDER — LIDOCAINE HYDROCHLORIDE 10 MG/ML
1 INJECTION, SOLUTION INFILTRATION; PERINEURAL
Status: DISCONTINUED | OUTPATIENT
Start: 2023-02-27 | End: 2023-02-27 | Stop reason: HOSPADM

## 2023-02-27 RX ORDER — BETAMETHASONE SODIUM PHOSPHATE AND BETAMETHASONE ACETATE 3; 3 MG/ML; MG/ML
INJECTION, SUSPENSION INTRA-ARTICULAR; INTRALESIONAL; INTRAMUSCULAR; SOFT TISSUE PRN
Status: DISCONTINUED | OUTPATIENT
Start: 2023-02-27 | End: 2023-02-27 | Stop reason: HOSPADM

## 2023-02-27 RX ORDER — NEOMYCIN SULFATE, POLYMYXIN B SULFATE, AND DEXAMETHASONE 3.5; 10000; 1 MG/G; [USP'U]/G; MG/G
OINTMENT OPHTHALMIC PRN
Status: DISCONTINUED | OUTPATIENT
Start: 2023-02-27 | End: 2023-02-27 | Stop reason: HOSPADM

## 2023-02-27 RX ORDER — PROCHLORPERAZINE EDISYLATE 5 MG/ML
5 INJECTION INTRAMUSCULAR; INTRAVENOUS
Status: DISCONTINUED | OUTPATIENT
Start: 2023-02-27 | End: 2023-02-27 | Stop reason: HOSPADM

## 2023-02-27 RX ORDER — FENTANYL CITRATE 50 UG/ML
100 INJECTION, SOLUTION INTRAMUSCULAR; INTRAVENOUS
Status: DISCONTINUED | OUTPATIENT
Start: 2023-02-27 | End: 2023-02-27 | Stop reason: HOSPADM

## 2023-02-27 RX ORDER — SODIUM CHLORIDE 0.9 % (FLUSH) 0.9 %
5-40 SYRINGE (ML) INJECTION EVERY 12 HOURS SCHEDULED
Status: DISCONTINUED | OUTPATIENT
Start: 2023-02-27 | End: 2023-02-27 | Stop reason: HOSPADM

## 2023-02-27 RX ORDER — ATROPINE SULFATE 10 MG/ML
SOLUTION/ DROPS OPHTHALMIC PRN
Status: DISCONTINUED | OUTPATIENT
Start: 2023-02-27 | End: 2023-02-27 | Stop reason: HOSPADM

## 2023-02-27 RX ORDER — HYDROMORPHONE HCL 110MG/55ML
0.5 PATIENT CONTROLLED ANALGESIA SYRINGE INTRAVENOUS EVERY 10 MIN PRN
Status: DISCONTINUED | OUTPATIENT
Start: 2023-02-27 | End: 2023-02-27 | Stop reason: HOSPADM

## 2023-02-27 RX ORDER — CEFAZOLIN SODIUM 1 G/3ML
INJECTION, POWDER, FOR SOLUTION INTRAMUSCULAR; INTRAVENOUS PRN
Status: DISCONTINUED | OUTPATIENT
Start: 2023-02-27 | End: 2023-02-27 | Stop reason: HOSPADM

## 2023-02-27 RX ORDER — SODIUM CHLORIDE 9 MG/ML
INJECTION, SOLUTION INTRAVENOUS PRN
Status: DISCONTINUED | OUTPATIENT
Start: 2023-02-27 | End: 2023-02-27 | Stop reason: HOSPADM

## 2023-02-27 RX ORDER — LIDOCAINE HYDROCHLORIDE 20 MG/ML
INJECTION, SOLUTION EPIDURAL; INFILTRATION; INTRACAUDAL; PERINEURAL PRN
Status: DISCONTINUED | OUTPATIENT
Start: 2023-02-27 | End: 2023-02-27 | Stop reason: HOSPADM

## 2023-02-27 RX ORDER — PROPOFOL 10 MG/ML
INJECTION, EMULSION INTRAVENOUS PRN
Status: DISCONTINUED | OUTPATIENT
Start: 2023-02-27 | End: 2023-02-27 | Stop reason: SDUPTHER

## 2023-02-27 RX ORDER — LIDOCAINE HYDROCHLORIDE 20 MG/ML
INJECTION, SOLUTION EPIDURAL; INFILTRATION; INTRACAUDAL; PERINEURAL PRN
Status: DISCONTINUED | OUTPATIENT
Start: 2023-02-27 | End: 2023-02-27 | Stop reason: SDUPTHER

## 2023-02-27 RX ORDER — EPHEDRINE SULFATE/0.9% NACL/PF 50 MG/5 ML
SYRINGE (ML) INTRAVENOUS PRN
Status: DISCONTINUED | OUTPATIENT
Start: 2023-02-27 | End: 2023-02-27 | Stop reason: SDUPTHER

## 2023-02-27 RX ORDER — TROPICAMIDE 10 MG/ML
1 SOLUTION/ DROPS OPHTHALMIC
Status: DISPENSED | OUTPATIENT
Start: 2023-02-27 | End: 2023-02-27

## 2023-02-27 RX ORDER — DIPHENHYDRAMINE HYDROCHLORIDE 50 MG/ML
12.5 INJECTION INTRAMUSCULAR; INTRAVENOUS
Status: DISCONTINUED | OUTPATIENT
Start: 2023-02-27 | End: 2023-02-27 | Stop reason: HOSPADM

## 2023-02-27 RX ORDER — SODIUM CHLORIDE 0.9 % (FLUSH) 0.9 %
5-40 SYRINGE (ML) INJECTION PRN
Status: DISCONTINUED | OUTPATIENT
Start: 2023-02-27 | End: 2023-02-27 | Stop reason: HOSPADM

## 2023-02-27 RX ORDER — BUPIVACAINE HYDROCHLORIDE 7.5 MG/ML
INJECTION, SOLUTION EPIDURAL; RETROBULBAR PRN
Status: DISCONTINUED | OUTPATIENT
Start: 2023-02-27 | End: 2023-02-27 | Stop reason: HOSPADM

## 2023-02-27 RX ORDER — ACETAMINOPHEN 500 MG
1000 TABLET ORAL ONCE
Status: COMPLETED | OUTPATIENT
Start: 2023-02-27 | End: 2023-02-27

## 2023-02-27 RX ORDER — PHENYLEPHRINE HCL 2.5 %
1 DROPS OPHTHALMIC (EYE)
Status: DISPENSED | OUTPATIENT
Start: 2023-02-27 | End: 2023-02-27

## 2023-02-27 RX ORDER — DEXTROSE MONOHYDRATE 100 MG/ML
INJECTION, SOLUTION INTRAVENOUS CONTINUOUS PRN
Status: DISCONTINUED | OUTPATIENT
Start: 2023-02-27 | End: 2023-02-27 | Stop reason: HOSPADM

## 2023-02-27 RX ORDER — SODIUM CHLORIDE, SODIUM LACTATE, POTASSIUM CHLORIDE, CALCIUM CHLORIDE 600; 310; 30; 20 MG/100ML; MG/100ML; MG/100ML; MG/100ML
INJECTION, SOLUTION INTRAVENOUS CONTINUOUS
Status: DISCONTINUED | OUTPATIENT
Start: 2023-02-27 | End: 2023-02-27 | Stop reason: HOSPADM

## 2023-02-27 RX ORDER — MIDAZOLAM HYDROCHLORIDE 2 MG/2ML
2 INJECTION, SOLUTION INTRAMUSCULAR; INTRAVENOUS
Status: DISCONTINUED | OUTPATIENT
Start: 2023-02-27 | End: 2023-02-27 | Stop reason: HOSPADM

## 2023-02-27 RX ORDER — ATROPINE SULFATE 10 MG/ML
1 SOLUTION/ DROPS OPHTHALMIC
Status: DISPENSED | OUTPATIENT
Start: 2023-02-27 | End: 2023-02-27

## 2023-02-27 RX ORDER — SODIUM CHLORIDE, POTASSIUM CHLORIDE, CALCIUM CHLORIDE, MAGNESIUM CHLORIDE, SODIUM ACETATE, AND SODIUM CITRATE 6.4; .75; .48; .3; 3.9; 1.7 MG/ML; MG/ML; MG/ML; MG/ML; MG/ML; MG/ML
SOLUTION IRRIGATION PRN
Status: DISCONTINUED | OUTPATIENT
Start: 2023-02-27 | End: 2023-02-27 | Stop reason: HOSPADM

## 2023-02-27 RX ADMIN — PROPOFOL 200 MG: 10 INJECTION, EMULSION INTRAVENOUS at 07:10

## 2023-02-27 RX ADMIN — ACETAMINOPHEN 1000 MG: 500 TABLET, FILM COATED ORAL at 05:56

## 2023-02-27 RX ADMIN — Medication 10 MG: at 07:24

## 2023-02-27 RX ADMIN — Medication 10 MG: at 07:33

## 2023-02-27 RX ADMIN — LIDOCAINE HYDROCHLORIDE 80 MG: 20 INJECTION, SOLUTION EPIDURAL; INFILTRATION; INTRACAUDAL; PERINEURAL at 07:10

## 2023-02-27 RX ADMIN — ATROPINE SULFATE 1 DROP: 10 SOLUTION/ DROPS OPHTHALMIC at 05:57

## 2023-02-27 RX ADMIN — TROPICAMIDE 1 DROP: 10 SOLUTION/ DROPS OPHTHALMIC at 05:58

## 2023-02-27 RX ADMIN — PHENYLEPHRINE HYDROCHLORIDE 1 DROP: 25 SOLUTION/ DROPS OPHTHALMIC at 05:58

## 2023-02-27 RX ADMIN — SODIUM CHLORIDE, POTASSIUM CHLORIDE, SODIUM LACTATE AND CALCIUM CHLORIDE: 600; 310; 30; 20 INJECTION, SOLUTION INTRAVENOUS at 05:56

## 2023-02-27 RX ADMIN — DEXAMETHASONE SODIUM PHOSPHATE 4 MG: 4 INJECTION, SOLUTION INTRAMUSCULAR; INTRAVENOUS at 07:40

## 2023-02-27 RX ADMIN — Medication 20 MG: at 07:41

## 2023-02-27 RX ADMIN — ONDANSETRON 4 MG: 2 INJECTION INTRAMUSCULAR; INTRAVENOUS at 07:40

## 2023-02-27 ASSESSMENT — PAIN SCALES - GENERAL
PAINLEVEL_OUTOF10: 0
PAINLEVEL_OUTOF10: 0

## 2023-02-27 ASSESSMENT — PAIN - FUNCTIONAL ASSESSMENT: PAIN_FUNCTIONAL_ASSESSMENT: 0-10

## 2023-02-27 NOTE — DISCHARGE INSTRUCTIONS
See attached discharge instructions. ACTIVITY  As tolerated and as directed by your doctor. Bathe or shower as directed by your doctor. DIET  Clear liquids until no nausea or vomiting; then light diet for the first day. Advance to regular diet on second day, unless your doctor orders otherwise. If nausea and vomiting continues, call your doctor. PAIN  Take pain medication as directed by your doctor. Call your doctor if pain is NOT relieved by medication. DO NOT take aspirin of blood thinners unless directed by your doctor. MEDICATION INTERACTION:During your procedure you potentially received a medication or medications which may reduce the effectiveness of oral contraceptives. Please consider other forms of contraception for 1 month following your procedure if you are currently using oral contraceptives as your primary form of birth control. In addition to this, we recommend continuing your oral contraceptive as prescribed, unless otherwise instructed by your physician, during this time      [address] IF   Excessive bleeding that does not stop after holding pressure over the area  Temperature of 101 degrees F or above  Excessive redness, swelling or bruising, and/ or green or yellow, smelly discharge from incision    After general anesthesia or intravenous sedation, for 24 hours or while taking prescription Narcotics:  Limit your activities  A responsible adult needs to be with you for the next 24 hours  Do not drive and operate hazardous machinery  Do not make important personal or business decisions  Do not drink alcoholic beverages  If you have not urinated within 8 hours after discharge, and you are experiencing discomfort from urinary retention, please go to the nearest ED. If you have sleep apnea and have a CPAP machine, please use it for all naps and sleeping. Please use caution when taking narcotics and any of your home medications that may cause drowsiness.   *  Please give a list of your current medications to your Primary Care Provider. *  Please update this list whenever your medications are discontinued, doses are      changed, or new medications (including over-the-counter products) are added. *  Please carry medication information at all times in case of emergency situations. These are general instructions for a healthy lifestyle:  No smoking/ No tobacco products/ Avoid exposure to second hand smoke  Surgeon General's Warning:  Quitting smoking now greatly reduces serious risk to your health. Obesity, smoking, and sedentary lifestyle greatly increases your risk for illness  A healthy diet, regular physical exercise & weight monitoring are important for maintaining a healthy lifestyle    You may be retaining fluid if you have a history of heart failure or if you experience any of the following symptoms:  Weight gain of 3 pounds or more overnight or 5 pounds in a week, increased swelling in our hands or feet or shortness of breath while lying flat in bed. Please call your doctor as soon as you notice any of these symptoms; do not wait until your next office visit.

## 2023-02-27 NOTE — ANESTHESIA PRE PROCEDURE
Department of Anesthesiology  Preprocedure Note       Name:  Nickolas William   Age:  63 y.o.  :  1960                                          MRN:  [id number]         Date:  2023      Surgeon: Edu Damian):  Chad Tolentino MD    Procedure: Procedure(s):  LEFT EYE 23 GUAGE VITRECTOMY, LASER, SILICONE OIL REMOVAL    Medications prior to admission:   Prior to Admission medications    Medication Sig Start Date End Date Taking? Authorizing Provider   sertraline (ZOLOFT) 50 MG tablet Take 50 mg by mouth in the morning. Historical Provider, MD   Cholecalciferol (VITAMIN D) 50 MCG ( UT) CAPS capsule Take 1 capsule by mouth in the morning. Historical Provider, MD   Biotin 39676 MCG TABS Take 1 tablet by mouth daily    Historical Provider, MD   Ascorbic Acid (VITAMIN C) 1000 MG tablet Take 1,000 mg by mouth in the morning. Historical Provider, MD       Current medications:    No current facility-administered medications for this visit. No current outpatient medications on file. Facility-Administered Medications Ordered in Other Visits   Medication Dose Route Frequency Provider Last Rate Last Admin    lidocaine 1 % injection 1 mL  1 mL IntraDERmal Once PRN Iram Nielsen MD        fentaNYL (SUBLIMAZE) injection 100 mcg  100 mcg IntraVENous Once PRN Iram Nielsen MD        lactated ringers IV soln infusion   IntraVENous Continuous Iram Nielsen  mL/hr at 23 0556 New Bag at 23 0556    sodium chloride flush 0.9 % injection 5-40 mL  5-40 mL IntraVENous 2 times per day Iram Nielsen MD        sodium chloride flush 0.9 % injection 5-40 mL  5-40 mL IntraVENous PRN Iram Nielsen MD        0.9 % sodium chloride infusion   IntraVENous PRN Iram Nielsen MD        midazolam PF (VERSED) injection 2 mg  2 mg IntraVENous Once PRN Iram Nielsen MD           Allergies:  No Known Allergies    Problem List:  There is no problem list on file for this patient.       Past Medical History:        Diagnosis Date   • Anxiety        Past Surgical History:        Procedure Laterality Date   • APPENDECTOMY      as teen   • COLONOSCOPY     • EYE SURGERY Left 07/19/2022    LEFT VITRECTOMY 23G/ LASER/ GAS FLUID EXCHANGE/ SCLERAL BUCKLE PLACEMENT/ LEFT performed by Durga Lo MD at [facility]       Social History:    Social History     Tobacco Use   • Smoking status: Never   • Smokeless tobacco: Never   Substance Use Topics   • Alcohol use: Never                                Counseling given: Not Answered      Vital Signs (Current):   There were no vitals filed for this visit.                                           BP Readings from Last 3 Encounters:   02/27/23 117/63   07/19/22 113/62       NPO Status:                                                                                 BMI:   Wt Readings from Last 3 Encounters:   02/27/23 145 lb (65.8 kg)   07/19/22 144 lb (65.3 kg)     There is no height or weight on file to calculate BMI.    CBC: No results found for: WBC, RBC, HGB, HCT, MCV, RDW, PLT    CMP: No results found for: NA, K, CL, CO2, BUN, CREATININE, GFRAA, AGRATIO, LABGLOM, GLUCOSE, GLU, PROT, CALCIUM, BILITOT, ALKPHOS, AST, ALT    POC Tests: No results for input(s): POCGLU, POCNA, POCK, POCCL, POCBUN, POCHEMO, POCHCT in the last 72 hours.    Coags: No results found for: PROTIME, INR, APTT    HCG (If Applicable): No results found for: PREGTESTUR, PREGSERUM, HCG, HCGQUANT     ABGs: No results found for: PHART, PO2ART, QID2FOL, UZL8VPL, BEART, L4VMGNMY     Type & Screen (If Applicable):  No results found for: LABABO, LABRH    Drug/Infectious Status (If Applicable):  No results found for: HIV, HEPCAB    COVID-19 Screening (If Applicable): No results found for: COVID19        Anesthesia Evaluation  Patient summary reviewed and Nursing notes reviewed no history of anesthetic complications:   Airway: Mallampati: I  TM distance: >3 FB   Neck ROM: full  Mouth opening: > = 3 FB   Dental:  normal exam         Pulmonary:Negative Pulmonary ROS breath sounds clear to auscultation                             Cardiovascular:Negative CV ROS  Exercise tolerance: good (>4 METS),           Rhythm: regular  Rate: normal                    Neuro/Psych:   (+) depression/anxiety             GI/Hepatic/Renal: Neg GI/Hepatic/Renal ROS            Endo/Other: Negative Endo/Other ROS                    Abdominal:             Vascular: negative vascular ROS. Other Findings:             Anesthesia Plan      general     ASA 1     (Plan for LMA)  Induction: intravenous. Anesthetic plan and risks discussed with patient and child/children.                         Jhony Joseph MD   2/27/2023

## 2023-02-27 NOTE — ANESTHESIA PROCEDURE NOTES
Airway  Date/Time: 2/27/2023 7:11 AM  Urgency: elective    Airway not difficult    General Information and Staff    Patient location during procedure: OR  Resident/CRNA: ARTEM Jean - CRNA  Performed: resident/CRNA     Indications and Patient Condition  Indications for airway management: anesthesia  Spontaneous ventilation: present  Sedation level: deep  Preoxygenated: yes  Patient position: sniffing  MILS not maintained throughout  Mask difficulty assessment: vent by bag mask    Final Airway Details  Final airway type: supraglottic airway      Successful airway: oropharyngeal  Size 4     Number of attempts at approach: 1  Ventilation between attempts: supraglottic airway  Number of other approaches attempted: 0    Additional Comments  ETCO2+/BBS=/dentition and lips as preop.   Villareal noteed at 20 cmH2O  no

## 2023-02-27 NOTE — ANESTHESIA POSTPROCEDURE EVALUATION
Department of Anesthesiology  Postprocedure Note    Patient: Bud Farrell  MRN: [id number]  YOB: 1960  Date of evaluation: 2/27/2023      Procedure Summary     Date: 02/27/23 Room / Location: [facility] OP OR 08 / [facility]    Anesthesia Start: 6586 Anesthesia Stop: 6713    Procedure: LEFT EYE 23 GUAGE VITRECTOMY, SILICONE OIL REMOVAL (Left: Eye) Diagnosis:       Retinal detachment with single break, left      (Retinal detachment with single break, left [H33.012])    Surgeons: Vicki Snow MD Responsible Provider: Howard Myers MD    Anesthesia Type: General ASA Status: 1          Anesthesia Type: General    Abdifatah Phase I: Abdifatah Score: 9    Abdifatah Phase II: Abdifatah Score: 10      Anesthesia Post Evaluation    Patient location during evaluation: PACU  Patient participation: complete - patient participated  Level of consciousness: awake and alert  Airway patency: patent  Nausea: well controlled. Complications: no  Cardiovascular status: acceptable.   Respiratory status: acceptable  Hydration status: stable

## 2023-02-27 NOTE — PERIOP NOTE
Discharge instructions discussed with daughter at bedside, no additional questions or concerns at this time. Hard copy of instructions given to daughter, no new prescriptions, eye drops given to daughter.

## 2023-02-27 NOTE — OP NOTE
[facility]  OPERATIVE REPORT    Name:  Esperanza Hendricks  MR#:  [id number]  :  1960  ACCOUNT #:  [de-identified]  DATE OF SERVICE:  2023    PREOPERATIVE DIAGNOSIS:  Retained silicone oil following retinal detachment repair in the left eye. POSTOPERATIVE DIAGNOSIS:  Retained silicone oil following retinal detachment repair in the left eye. PROCEDURE PERFORMED:   23-gauge pars plana vitrectomy, removal of silicone oil, and fluid-air exchange, left eye. SURGEON:  Chad Tolentino MD    ASSISTANT:  None. ANESTHESIA:  General.    COMPLICATIONS:  None. SPECIMENS REMOVED:  None. IMPLANTS:  None. ESTIMATED BLOOD LOSS:  None. FLUIDS:  Per Anesthesia. FINDINGS:  Attached retina with retained silicone oil, left eye. INSTRUMENTS:  All instruments were accounted for at the end of the case. NARRATIVE:  The patient is a very pleasant woman that presented with chronic detachment that was repaired by scleral buckling and vitrectomy with silicone oil several months ago. She did well in the postoperative period and presents today for removal of oil. PROCEDURE:  The patient was evaluated in preop holding area. The operative eye was marked and informed consent documented. The risks discussed included, not limited to loss of vision, loss of eye, bleeding, infection, worsening cataract, glaucoma, recurrent tear detachment, cosmetic deformity including drooping of lid or ocular misalignment and possible need for additional procedures. Once all questions were answered to the patient's satisfaction, care of the patient was transferred to Anesthesia service who brought the patient back to the operating room and induced general anesthesia. The left eye was given a retrobulbar block consisting of 50:50 mixture of 2% lidocaine and 0.75% Marcaine. Next, the right eye was taped and shielded.   The left eye was prepped and draped in usual sterile fashion for ophthalmic surgery.  A Brent speculum was placed in the left eye.  Three-port pars plana vitrectomy was initiated with insertion of infusion cannula, 4 mm back from the corneoscleral limbus in beveled manner inferotemporally.  Once the tip of the infusion cannula was confirmed to be within the vitreous cavity, the infusion was turned on and line secured.  Similarly, supertemporal and superonasal cannulas were placed.  Next, the silicone oil was removed from the vitreous cavity using automated aspiration without issue.  Next, the emulsified silicone oil in the anterior chamber was removed manually using a 3 mL syringe with a 30-gauge needle through a temporal paracentesis.  Next, multiple fluid-air exchanges were performed to remove any residual oil and the eye was left under air.  The retina was found to be nicely attached with no concerning areas for recurrent detachment.  Next, the cannulas were removed atraumatically.  All sclerotomies were assessed and found to be watertight and the eye was left at physiologic pressure.  A subconjunctival injection of Ancef and dexamethasone was given.  The Brent speculum was removed.  The eye washed and dried and a drop of atropine, small amount Maxitrol ointment placed in the eye which was then patched and shielded.  The patient tolerated the procedure very well, transferred to recovery room in stable condition.  She was advised to avoid a flat on back position and follow with us tomorrow in the office.      MIGDALIA WYNN MD      [id number]  D:  02/27/2023 9:21  T:  02/27/2023 10:46  JOB #:  [id number]

## (undated) DEVICE — SYRINGE MED 10ML LUERLOCK TIP W/O SFTY DISP

## (undated) DEVICE — Device: Brand: MILLEX-OR

## (undated) DEVICE — LENS VITRCTMY OCU FLAT DISP

## (undated) DEVICE — ADMINISTRATION SET IV SINGLE FLUID 80 IN IRRIGATION KT

## (undated) DEVICE — BIOM OPTIC SET DISPOSABLE, WITH BIOM HD PROFESSIONAL LENS FOR F=175 MM: Brand: BIOM OPTIC SET DISPOSABLE, WITH BIOM HD PROFESSIONAL LENS FOR F=175 MM

## (undated) DEVICE — 25 GA ILLUMINATED FLEXIBLE CURVED LASER PROBE: Brand: ALCON, ENGAUGE

## (undated) DEVICE — SOLUTION IRRIGATION BAL SALT SOLUTION 500 ML BTL 6/CA BSS +

## (undated) DEVICE — VISCOUS FLUID CONTROL PAK: Brand: CONSTELLATION

## (undated) DEVICE — MICROSURGICAL INSTRUMENT 25GA SOFT TIP CANNULA, DSP, 0.8MM: Brand: ALCON

## (undated) DEVICE — CONSTELLATION VISION SYSTEM 7500 CPM ULTRAVIT PROBE STRAIGHT ENDOILLUMINATOR 23GA TOTALPLUS VITRECTOMY PAK EDGEPLUS BLADE VALVED ENTRY SYSTEM: Brand: CONSTELLATION, ULTRAVIT, TOTALPLUS, EDGEPLUS

## (undated) DEVICE — BETADINE 5% EYE SOL

## (undated) DEVICE — GLOVE SURG SZ 7 CRM LTX FREE POLYISOPRENE POLYMER BEAD ANTI

## (undated) DEVICE — CONSTELLATION VISION SYSTEM 7500 CPM ULTRAVIT PROBE STRAIGHT ENDOILLUMINATOR 25+ TOTALPLUS VITRECTOMY PAK EDGEPLUS BLADE VALVED ENTRY SYSTEM: Brand: CONSTELLATION, ULTRAVIT, 25+, TOTALPLUS, EDGEPLUS

## (undated) DEVICE — SURGICAL PROCEDURE PACK EYE CDS

## (undated) DEVICE — SOLUTION IV STRL H2O 500 ML AQUALITE POUR BTL

## (undated) DEVICE — GLOVE SURG SZ 75 L12IN FNGR THK79MIL GRN LTX FREE

## (undated) DEVICE — SUTURE VCRL SZ 7-0 L18IN ABSRB VLT L6.5MM TG140-8 3/8 CIR J546G

## (undated) DEVICE — SYRINGE, LUER LOCK, 60ML: Brand: MEDLINE

## (undated) DEVICE — DIATHERMY PROBE DSP, 25G: Brand: ALCON GRIESHABER

## (undated) DEVICE — NEEDLE ANES 23GA L1.5IN RETROBLB

## (undated) DEVICE — 25+® REVOLUTION DSP SERRATED FORCEPS: Brand: ALCON GRIESHABER REVOLUTION 25+

## (undated) DEVICE — SUTURE PLN GUT SZ 6-0 L18IN ABSRB YELLOWISH TAN L6.5MM 1735G

## (undated) DEVICE — 23G FINESSE® FLEX LOOP DSP: Brand: ALCON GREISHABER FINESSE

## (undated) DEVICE — CANNULA 25GA SOFT TIP 1.0MM SIL TIP

## (undated) DEVICE — 1060 S-DRAPE SPCL INCISE 10/BX 4BX/CS: Brand: STERI-DRAPE™

## (undated) DEVICE — DISPOSABLE BIPOLAR CODE, 12' (3.66 M): Brand: CONMED

## (undated) DEVICE — SUTURE ETHLN SZ 5-0 L18IN NONABSORBABLE BLK L8MM RD-1 CNTR 749G

## (undated) DEVICE — REVOLUTION DSP 23G ILM FORCEPS: Brand: ALCON GRIESHABER REVOLUTION

## (undated) DEVICE — NEEDLE HYPO 25GA L1.5IN BLU POLYPR HUB S STL REG BVL STR

## (undated) DEVICE — SUTURE PERMAHAND SZ 3-0 L18IN NONABSORBABLE BLK SILK BRAID A184H

## (undated) DEVICE — DRAPE TOWEL: Brand: CONVERTORS

## (undated) DEVICE — MICROSURGICAL INSTRUMENT 20GA SOFT TIP NEEDLE: Brand: ALCON GREISHABER

## (undated) DEVICE — KENDALL SCD EXPRESS SLEEVES, KNEE LENGTH, MEDIUM: Brand: KENDALL SCD

## (undated) DEVICE — GARMENT,MEDLINE,DVT,INT,CALF,MED, GEN2: Brand: MEDLINE

## (undated) DEVICE — PREP-RESISTANT MARKER W/ RULER: Brand: MEDLINE INDUSTRIES, INC.

## (undated) DEVICE — NEEDLE HYPO 18GA L1.5IN PNK S STL HUB POLYPR SHLD REG BVL